# Patient Record
Sex: MALE | Race: WHITE | URBAN - METROPOLITAN AREA
[De-identification: names, ages, dates, MRNs, and addresses within clinical notes are randomized per-mention and may not be internally consistent; named-entity substitution may affect disease eponyms.]

---

## 2020-01-08 ENCOUNTER — NEW PATIENT (OUTPATIENT)
Dept: URBAN - METROPOLITAN AREA CLINIC 44 | Facility: CLINIC | Age: 60
End: 2020-01-08
Payer: COMMERCIAL

## 2020-01-08 PROCEDURE — 92004 COMPRE OPH EXAM NEW PT 1/>: CPT | Performed by: OPTOMETRIST

## 2020-01-08 ASSESSMENT — INTRAOCULAR PRESSURE
OD: 17
OS: 17

## 2020-01-08 ASSESSMENT — KERATOMETRY
OD: 39.75
OS: 39.13

## 2020-01-08 ASSESSMENT — VISUAL ACUITY
OD: 20/25
OS: 20/25

## 2021-07-28 ENCOUNTER — OFFICE VISIT (OUTPATIENT)
Dept: URBAN - METROPOLITAN AREA CLINIC 44 | Facility: CLINIC | Age: 61
End: 2021-07-28
Payer: COMMERCIAL

## 2021-07-28 DIAGNOSIS — H52.4 PRESBYOPIA: ICD-10-CM

## 2021-07-28 DIAGNOSIS — H17.823 PERIPHERAL OPACITY OF CORNEA, BILATERAL: Primary | ICD-10-CM

## 2021-07-28 DIAGNOSIS — H25.13 AGE-RELATED NUCLEAR CATARACT, BILATERAL: ICD-10-CM

## 2021-07-28 PROCEDURE — 92134 CPTRZ OPH DX IMG PST SGM RTA: CPT | Performed by: OPTOMETRIST

## 2021-07-28 PROCEDURE — 92014 COMPRE OPH EXAM EST PT 1/>: CPT | Performed by: OPTOMETRIST

## 2021-07-28 ASSESSMENT — KERATOMETRY
OS: 39.63
OD: 38.88

## 2021-07-28 ASSESSMENT — VISUAL ACUITY
OS: 20/30
OD: 20/30

## 2021-07-28 NOTE — IMPRESSION/PLAN
Impression: Peripheral opacity of cornea, bilateral Plan: Past RK OU in late 80's. Patient inquiring about further surgery or non-surgical options to improve vision. Explained that patient is not a good candidate for further refractive surgery due to fluctuations in vision. Patient can see [facility] in future if glasses are not sufficiently improving vision.

## 2021-07-28 NOTE — IMPRESSION/PLAN
Impression: Presbyopia: H52.4. Plan: Patient will return for a refraction check in near future when he is back in town.

## 2022-07-28 ENCOUNTER — OFFICE VISIT (OUTPATIENT)
Dept: URBAN - METROPOLITAN AREA CLINIC 44 | Facility: CLINIC | Age: 62
End: 2022-07-28
Payer: COMMERCIAL

## 2022-07-28 DIAGNOSIS — H43.813 VITREOUS DEGENERATION, BILATERAL: ICD-10-CM

## 2022-07-28 DIAGNOSIS — H52.4 PRESBYOPIA: ICD-10-CM

## 2022-07-28 DIAGNOSIS — H17.823 PERIPHERAL OPACITY OF CORNEA, BILATERAL: Primary | ICD-10-CM

## 2022-07-28 DIAGNOSIS — H25.813 COMBINED FORMS OF AGE-RELATED CATARACT, BILATERAL: ICD-10-CM

## 2022-07-28 PROCEDURE — 92134 CPTRZ OPH DX IMG PST SGM RTA: CPT | Performed by: OPTOMETRIST

## 2022-07-28 PROCEDURE — 99214 OFFICE O/P EST MOD 30 MIN: CPT | Performed by: OPTOMETRIST

## 2022-07-28 ASSESSMENT — INTRAOCULAR PRESSURE
OD: 22
OS: 20

## 2022-07-28 ASSESSMENT — KERATOMETRY
OD: 38.63
OS: 40.25

## 2022-07-28 ASSESSMENT — VISUAL ACUITY
OS: 20/20
OD: 20/25

## 2022-07-28 NOTE — IMPRESSION/PLAN
Impression: Presbyopia: H52.4. Plan: Large refractive shift OU, but patient noticed improvement in vision with today's mrx. Release new spec rx.

## 2022-07-28 NOTE — IMPRESSION/PLAN
Impression: Peripheral opacity of cornea, bilateral Plan: Past RK OU in late 80's. Patient's vision fluctuations are improving. Monitor.